# Patient Record
Sex: FEMALE | Race: WHITE | NOT HISPANIC OR LATINO | Employment: OTHER | URBAN - METROPOLITAN AREA
[De-identification: names, ages, dates, MRNs, and addresses within clinical notes are randomized per-mention and may not be internally consistent; named-entity substitution may affect disease eponyms.]

---

## 2024-01-18 PROBLEM — R06.09 DYSPNEA ON EXERTION: Status: ACTIVE | Noted: 2024-01-18

## 2024-01-18 PROBLEM — E89.0 HYPOTHYROIDISM, POSTRADIOIODINE THERAPY: Status: ACTIVE | Noted: 2024-01-18

## 2024-01-18 PROBLEM — I10 BENIGN ESSENTIAL HYPERTENSION: Status: ACTIVE | Noted: 2024-01-18

## 2024-01-18 PROBLEM — R26.89 IMBALANCE: Status: ACTIVE | Noted: 2024-01-18

## 2024-01-18 PROBLEM — E05.00 GOITER, TOXIC DIFFUSE: Status: ACTIVE | Noted: 2024-01-18

## 2024-01-18 PROBLEM — I48.91 ATRIAL FIBRILLATION (MULTI): Status: ACTIVE | Noted: 2024-01-18

## 2024-01-18 PROBLEM — E04.1 THYROID NODULE: Status: ACTIVE | Noted: 2024-01-18

## 2024-01-18 PROBLEM — E05.90 HYPERTHYROIDISM: Status: ACTIVE | Noted: 2024-01-18

## 2024-01-18 PROBLEM — I65.23 ARTERIOSCLEROSIS OF BOTH CAROTID ARTERIES: Status: ACTIVE | Noted: 2024-01-18

## 2024-01-18 PROBLEM — R94.31 ABNORMAL ECG: Status: ACTIVE | Noted: 2024-01-18

## 2024-01-18 PROBLEM — R26.2 DIFFICULTY WALKING: Status: ACTIVE | Noted: 2024-01-18

## 2024-01-18 PROBLEM — E78.5 DYSLIPIDEMIA: Status: ACTIVE | Noted: 2024-01-18

## 2024-01-18 RX ORDER — LEVOTHYROXINE SODIUM 88 UG/1
88 TABLET ORAL DAILY
COMMUNITY

## 2024-01-18 RX ORDER — CALCIUM CARBONATE/VITAMIN D3 600 MG-10
TABLET ORAL
COMMUNITY

## 2024-01-18 RX ORDER — METOPROLOL SUCCINATE 50 MG/1
50 TABLET, EXTENDED RELEASE ORAL DAILY
COMMUNITY
Start: 2018-12-05 | End: 2024-05-08

## 2024-01-18 RX ORDER — ERGOCALCIFEROL 1.25 MG/1
1.25 CAPSULE ORAL
COMMUNITY

## 2024-02-22 ENCOUNTER — OFFICE VISIT (OUTPATIENT)
Dept: CARDIOLOGY | Facility: CLINIC | Age: 74
End: 2024-02-22
Payer: COMMERCIAL

## 2024-02-22 VITALS
SYSTOLIC BLOOD PRESSURE: 128 MMHG | HEIGHT: 67 IN | OXYGEN SATURATION: 97 % | HEART RATE: 62 BPM | DIASTOLIC BLOOD PRESSURE: 78 MMHG | WEIGHT: 212 LBS | BODY MASS INDEX: 33.27 KG/M2

## 2024-02-22 DIAGNOSIS — I10 BENIGN ESSENTIAL HYPERTENSION: ICD-10-CM

## 2024-02-22 DIAGNOSIS — I65.23 ARTERIOSCLEROSIS OF BOTH CAROTID ARTERIES: ICD-10-CM

## 2024-02-22 DIAGNOSIS — E78.5 DYSLIPIDEMIA: ICD-10-CM

## 2024-02-22 DIAGNOSIS — I48.21 PERMANENT ATRIAL FIBRILLATION (MULTI): Primary | ICD-10-CM

## 2024-02-22 PROCEDURE — 99214 OFFICE O/P EST MOD 30 MIN: CPT | Performed by: INTERNAL MEDICINE

## 2024-02-22 PROCEDURE — 1160F RVW MEDS BY RX/DR IN RCRD: CPT | Performed by: INTERNAL MEDICINE

## 2024-02-22 PROCEDURE — 1036F TOBACCO NON-USER: CPT | Performed by: INTERNAL MEDICINE

## 2024-02-22 PROCEDURE — 3078F DIAST BP <80 MM HG: CPT | Performed by: INTERNAL MEDICINE

## 2024-02-22 PROCEDURE — 1126F AMNT PAIN NOTED NONE PRSNT: CPT | Performed by: INTERNAL MEDICINE

## 2024-02-22 PROCEDURE — 1159F MED LIST DOCD IN RCRD: CPT | Performed by: INTERNAL MEDICINE

## 2024-02-22 PROCEDURE — 3074F SYST BP LT 130 MM HG: CPT | Performed by: INTERNAL MEDICINE

## 2024-02-22 PROCEDURE — 93000 ELECTROCARDIOGRAM COMPLETE: CPT | Performed by: INTERNAL MEDICINE

## 2024-02-22 ASSESSMENT — ENCOUNTER SYMPTOMS
DEPRESSION: 0
LOSS OF SENSATION IN FEET: 0
OCCASIONAL FEELINGS OF UNSTEADINESS: 0

## 2024-02-22 ASSESSMENT — PAIN SCALES - GENERAL: PAINLEVEL: 0-NO PAIN

## 2024-02-22 NOTE — PROGRESS NOTES
Chief Complaint:   No chief complaint on file.     History Of Present Illness:    Cindy Swanson is a 74 y.o. female with a history of atrial fibrillation on long-term oral anticoagulation with Xarelto, carotid artery stenosis, dyslipidemia, hypertension and thyroid disease here for routine follow-up.      Overall doing well.  Denies any exertional chest pain or shortness of breath.  Denies any palpitations.  Says that if she is on the treadmill that her heart rate will go up higher up however she does not feel lightheaded nor does she have palpitations during that time.    She started on an herbal supplement to help with a cold sore.  She started reading into it and it said that she may experience jitteriness.  She was noticing it was harder to sleep at night.  She stopped taking the medication.    She does have blood work done routinely through her primary care physician however it is not on our platform.  We did go over her most recent labs including her CMP, CBC and lipid panel.     Carotid duplex 12/27/2021: Less than 50% bilaterally.     Echocardiogram 5/1/18 demonstrating normal LV size and function, EF 60-65%. Mildly dilated RV with normal function. RVSP 37 mmHg. Mild left atrial enlargement. Mild MR TR and MA. Mild mitral annular calcification.     Treadmill nuclear stress testing 4/27/18 demonstrating very poor aerobic capacity inferior and anterior ST depressions on the ECG portion of the stress test. No nuclear evidence of ischemia.     She was found to have a questionable carotid stenosis on a CAT scan. Carotid duplex 3/24/17 demonstrated 50-69% stenosis of the left internal carotid artery. Less than 50% in the right internal carotid artery.     Echocardiogram 6/18/13 demonstrating low normal LV function, EF 55%. Mitral annular calcification with trace to mild MR. Aortic sclerosis with trivial to mild aortic stenosis. Mild TR with mild to moderate pulmonary pretension.      Past Medical History:  She  "has no past medical history on file.    Past Surgical History:  She has a past surgical history that includes Hysterectomy (03/24/2015) and Total knee arthroplasty (03/24/2015).      Social History:  She reports that she has never smoked. She has never used smokeless tobacco. She reports that she does not drink alcohol and does not use drugs.    Family History:  No family history on file.     Allergies:  Atorvastatin, Ibuprofen, and Lisinopril    Outpatient Medications:  Current Outpatient Medications   Medication Instructions    ergocalciferol (VITAMIN D-2) 1.25 mg, oral, Weekly    levothyroxine (SYNTHROID, LEVOXYL) 88 mcg, oral, Daily    metoprolol succinate XL (TOPROL-XL) 50 mg, oral, Daily    omega-3 fatty acids-fish oil (One-Per-Day Omega-3) 684-1,200 mg capsule oral    rivaroxaban (XARELTO) 20 mg, oral       Last Recorded Vitals:  Visit Vitals  /78 (BP Location: Left arm, Patient Position: Sitting)   Pulse 62   Ht 1.702 m (5' 7\")   Wt 96.2 kg (212 lb)   SpO2 97%   BMI 33.20 kg/m²   Smoking Status Never   BSA 2.13 m²      LASTWT(3):   Wt Readings from Last 3 Encounters:   02/22/24 96.2 kg (212 lb)   02/23/23 97.1 kg (214 lb)   08/24/22 97.1 kg (214 lb)       Physical Exam:  In general: alert and in no acute distress.   HEENT: Carotid upstrokes normal with no bruits. JVP is normal.   Pulmonary: Clear to auscultation bilaterally.  Cardiovascular: S1,S2, irregularly irregular. No appreciable murmurs, rubs or gallops.   Lower extremities: Warm. 2+ distal pulses. No edema.     Last Labs:  CBC -  No results for input(s): \"WBC\", \"HGB\", \"HCT\", \"PLT\", \"MCV\" in the last 08690 hours.    CMP -  No results for input(s): \"NA\", \"K\", \"CL\", \"CO2\", \"ANIONGAP\", \"BUN\", \"CREATININE\", \"EGFR\", \"MG\" in the last 82545 hours.  No results for input(s): \"ALBUMIN\", \"ALKPHOS\", \"ALT\", \"AST\", \"BILITOT\" in the last 67205 hours.    No lab exists for component: \"CA\"    LIPID PANEL -   No results for input(s): \"CHOL\", \"LDLCALC\", \"LDLF\", " "\"HDL\", \"TRIG\" in the last 48390 hours.    No results for input(s): \"BNP\", \"HGBA1C\" in the last 19107 hours.      Assessment/Plan   1) persistent atrial fibrillation: Rates are well controlled without any symptoms of lightheadedness. Continue Xarelto and metoprolol.     2) iron deficiency anemia: Most recent Hgb normal.      3) dyslipidemia: Continue omega-3 fatty acid.     4) hypertension: Blood pressures well controlled at home.     5) mild left internal carotid stenosis: Most recent carotid duplex demonstrates less than 50% bilaterally. I explained that it is likely that the previous result of 50 to 69% was based on a mildly elevated velocity. Once again there is no need for repeat imaging at this time however we can consider repeat carotid duplex in 5 years.     6) follow-up: 1 year or sooner if needed       Sung Spence MD  "

## 2024-03-16 DIAGNOSIS — I48.21 PERMANENT ATRIAL FIBRILLATION (MULTI): ICD-10-CM

## 2024-03-18 RX ORDER — RIVAROXABAN 20 MG/1
20 TABLET, FILM COATED ORAL DAILY
Qty: 90 TABLET | Refills: 3 | Status: SHIPPED | OUTPATIENT
Start: 2024-03-18

## 2024-05-07 DIAGNOSIS — Z00.00 ENCOUNTER FOR GENERAL ADULT MEDICAL EXAMINATION WITHOUT ABNORMAL FINDINGS: ICD-10-CM

## 2024-05-08 RX ORDER — METOPROLOL SUCCINATE 50 MG/1
50 TABLET, EXTENDED RELEASE ORAL DAILY
Qty: 90 TABLET | Refills: 3 | Status: SHIPPED | OUTPATIENT
Start: 2024-05-08

## 2025-02-24 PROBLEM — I48.0 PAROXYSMAL ATRIAL FIBRILLATION (MULTI): Status: ACTIVE | Noted: 2024-01-18

## 2025-02-24 NOTE — PROGRESS NOTES
Chief Complaint:   No chief complaint on file.     History Of Present Illness:    Cindy Swanson is a 75 y.o. female with a history of atrial fibrillation on long-term oral anticoagulation with Xarelto, carotid artery stenosis, dyslipidemia, hypertension and thyroid disease here for routine follow-up.      She has been feeling good.  She denies any exertional chest pain or shortness of breath.  She denies any palpitations or lightheadedness.    She brings in a copy of her blood work that was most recently performed.     Carotid duplex 12/27/2021: Less than 50% bilaterally.     Echocardiogram 5/1/18 demonstrating normal LV size and function, EF 60-65%. Mildly dilated RV with normal function. RVSP 37 mmHg. Mild left atrial enlargement. Mild MR TR and IN. Mild mitral annular calcification.     Treadmill nuclear stress testing 4/27/18 demonstrating very poor aerobic capacity inferior and anterior ST depressions on the ECG portion of the stress test. No nuclear evidence of ischemia.     She was found to have a questionable carotid stenosis on a CAT scan. Carotid duplex 3/24/17 demonstrated 50-69% stenosis of the left internal carotid artery. Less than 50% in the right internal carotid artery.     Echocardiogram 6/18/13 demonstrating low normal LV function, EF 55%. Mitral annular calcification with trace to mild MR. Aortic sclerosis with trivial to mild aortic stenosis. Mild TR with mild to moderate pulmonary pretension.      Past Medical History:  She has no past medical history on file.    Past Surgical History:  She has a past surgical history that includes Hysterectomy (03/24/2015) and Total knee arthroplasty (03/24/2015).      Social History:  She reports that she has never smoked. She has never used smokeless tobacco. She reports that she does not drink alcohol and does not use drugs.    Family History:  No family history on file.     Allergies:  Atorvastatin, Ibuprofen, and Lisinopril    Outpatient  "Medications:  Current Outpatient Medications   Medication Instructions    cholecalciferol (Vitamin D-3) 50 MCG (2000 UT) tablet 1 tablet Once a day , Orally    ergocalciferol (VITAMIN D-2) 1.25 mg, Once Weekly    ferrous sulfate 84 mg, Daily with breakfast    levothyroxine (SYNTHROID, LEVOXYL) 100 mcg, Daily    metoprolol succinate XL (TOPROL-XL) 50 mg, oral, Daily    omega-3 fatty acids-fish oil (One-Per-Day Omega-3) 684-1,200 mg capsule Daily    Xarelto 20 mg, oral, Daily, take with evening meal       Last Recorded Vitals:  Visit Vitals  /78 (BP Location: Left arm, Patient Position: Sitting, BP Cuff Size: Adult)   Pulse 70   Ht 1.702 m (5' 7\")   Wt 101 kg (222 lb)   SpO2 97%   BMI 34.77 kg/m²   Smoking Status Never   BSA 2.19 m²      LASTWT(3):   Wt Readings from Last 3 Encounters:   02/25/25 101 kg (222 lb)   02/22/24 96.2 kg (212 lb)   02/23/23 97.1 kg (214 lb)       Physical Exam:  In general: alert and in no acute distress.   HEENT: Carotid upstrokes normal with no bruits. JVP is normal.   Pulmonary: Clear to auscultation bilaterally.  Cardiovascular: S1,S2, irregularly irregular. No appreciable murmurs, rubs or gallops.   Lower extremities: Warm. 2+ distal pulses. No edema.     Last Labs:  CBC -  No results for input(s): \"WBC\", \"HGB\", \"HCT\", \"PLT\", \"MCV\" in the last 94458 hours.    CMP -  No results for input(s): \"NA\", \"K\", \"CL\", \"CO2\", \"ANIONGAP\", \"BUN\", \"CREATININE\", \"EGFR\", \"MG\" in the last 31604 hours.  No results for input(s): \"ALBUMIN\", \"ALKPHOS\", \"ALT\", \"AST\", \"BILITOT\" in the last 43502 hours.    No lab exists for component: \"CA\"    LIPID PANEL -   No results for input(s): \"CHOL\", \"LDLCALC\", \"LDLF\", \"HDL\", \"TRIG\" in the last 33069 hours.    No results for input(s): \"BNP\", \"HGBA1C\" in the last 34783 hours.      Assessment/Plan   1) persistent atrial fibrillation: Heart rate controlled.  Continue metoprolol succinate 50 mg daily.  Appropriately anticoagulated with Xarelto 20 mg daily.  She should " continue this dose for her elevated [id number] score of 3-4 (has 3 points for age and gender and had been hypertensive in the past).     2) iron deficiency anemia: Hemoglobin is stable on iron.     3) dyslipidemia: Talked about the increased risk of atrial fibrillation with fish oil and the fact that it does not decrease the risk of MI or stroke.  Asked her to stop the omega-3 fish oil.     4) hypertension: Has not been checking blood pressure routinely at home.  Asked her to check her blood pressure after sitting waiting 5 minutes.  Asked her to then call our office over the next week or 2 with readings.  If elevated we can always consider adding either amlodipine or hydrochlorothiazide.     5) mild left internal carotid stenosis: I will ask for repeat carotid duplex to be performed next year which would be 5 years from her last carotid duplex.     6) follow-up: 1 year or sooner if needed       Sung Spence MD

## 2025-02-25 ENCOUNTER — APPOINTMENT (OUTPATIENT)
Dept: CARDIOLOGY | Facility: CLINIC | Age: 75
End: 2025-02-25
Payer: COMMERCIAL

## 2025-02-25 VITALS
HEART RATE: 70 BPM | WEIGHT: 222 LBS | DIASTOLIC BLOOD PRESSURE: 78 MMHG | HEIGHT: 67 IN | SYSTOLIC BLOOD PRESSURE: 140 MMHG | BODY MASS INDEX: 34.84 KG/M2 | OXYGEN SATURATION: 97 %

## 2025-02-25 DIAGNOSIS — I48.0 PAROXYSMAL ATRIAL FIBRILLATION (MULTI): Primary | ICD-10-CM

## 2025-02-25 DIAGNOSIS — I10 BENIGN ESSENTIAL HYPERTENSION: ICD-10-CM

## 2025-02-25 DIAGNOSIS — E78.5 DYSLIPIDEMIA: ICD-10-CM

## 2025-02-25 DIAGNOSIS — I48.21 PERMANENT ATRIAL FIBRILLATION (MULTI): ICD-10-CM

## 2025-02-25 DIAGNOSIS — I65.23 ARTERIOSCLEROSIS OF BOTH CAROTID ARTERIES: ICD-10-CM

## 2025-02-25 PROCEDURE — 1159F MED LIST DOCD IN RCRD: CPT | Performed by: INTERNAL MEDICINE

## 2025-02-25 PROCEDURE — 1160F RVW MEDS BY RX/DR IN RCRD: CPT | Performed by: INTERNAL MEDICINE

## 2025-02-25 PROCEDURE — 99214 OFFICE O/P EST MOD 30 MIN: CPT | Mod: 25 | Performed by: INTERNAL MEDICINE

## 2025-02-25 PROCEDURE — 1036F TOBACCO NON-USER: CPT | Performed by: INTERNAL MEDICINE

## 2025-02-25 PROCEDURE — 93010 ELECTROCARDIOGRAM REPORT: CPT | Performed by: INTERNAL MEDICINE

## 2025-02-25 PROCEDURE — 3078F DIAST BP <80 MM HG: CPT | Performed by: INTERNAL MEDICINE

## 2025-02-25 PROCEDURE — 1126F AMNT PAIN NOTED NONE PRSNT: CPT | Performed by: INTERNAL MEDICINE

## 2025-02-25 PROCEDURE — 3077F SYST BP >= 140 MM HG: CPT | Performed by: INTERNAL MEDICINE

## 2025-02-25 PROCEDURE — 99214 OFFICE O/P EST MOD 30 MIN: CPT | Performed by: INTERNAL MEDICINE

## 2025-02-25 PROCEDURE — 93005 ELECTROCARDIOGRAM TRACING: CPT | Performed by: INTERNAL MEDICINE

## 2025-02-25 RX ORDER — CHOLECALCIFEROL (VITAMIN D3) 50 MCG
TABLET ORAL
COMMUNITY

## 2025-02-25 RX ORDER — FERROUS SULFATE 325(65) MG
84 TABLET ORAL
COMMUNITY

## 2025-02-25 ASSESSMENT — ENCOUNTER SYMPTOMS
OCCASIONAL FEELINGS OF UNSTEADINESS: 0
LOSS OF SENSATION IN FEET: 0
DEPRESSION: 0

## 2025-02-25 ASSESSMENT — PAIN SCALES - GENERAL: PAINLEVEL_OUTOF10: 0-NO PAIN

## 2025-03-18 ENCOUNTER — TELEPHONE (OUTPATIENT)
Dept: CARDIOLOGY | Facility: CLINIC | Age: 75
End: 2025-03-18
Payer: COMMERCIAL

## 2025-03-18 NOTE — TELEPHONE ENCOUNTER
"Per Dr. Spence via secure chat:  \"Can you please let the patient know that a couple of her readings were high but the rest of them were fine. I think we can continue with her current medicines without any changes.\"  Spoke to patient, who verbalized understanding.   "

## 2025-04-27 DIAGNOSIS — Z00.00 ENCOUNTER FOR GENERAL ADULT MEDICAL EXAMINATION WITHOUT ABNORMAL FINDINGS: ICD-10-CM

## 2025-04-28 RX ORDER — METOPROLOL SUCCINATE 50 MG/1
50 TABLET, EXTENDED RELEASE ORAL DAILY
Qty: 90 TABLET | Refills: 3 | Status: SHIPPED | OUTPATIENT
Start: 2025-04-28

## 2025-08-11 DIAGNOSIS — Z00.00 ENCOUNTER FOR GENERAL ADULT MEDICAL EXAMINATION WITHOUT ABNORMAL FINDINGS: ICD-10-CM

## 2025-08-11 RX ORDER — METOPROLOL SUCCINATE 50 MG/1
50 TABLET, EXTENDED RELEASE ORAL DAILY
Qty: 90 TABLET | Refills: 3 | Status: SHIPPED | OUTPATIENT
Start: 2025-08-11

## 2026-02-25 ENCOUNTER — APPOINTMENT (OUTPATIENT)
Dept: CARDIOLOGY | Facility: HOSPITAL | Age: 76
End: 2026-02-25
Payer: COMMERCIAL

## 2026-02-25 ENCOUNTER — APPOINTMENT (OUTPATIENT)
Dept: VASCULAR SURGERY | Facility: CLINIC | Age: 76
End: 2026-02-25
Payer: COMMERCIAL